# Patient Record
Sex: FEMALE | Race: WHITE | NOT HISPANIC OR LATINO | URBAN - METROPOLITAN AREA
[De-identification: names, ages, dates, MRNs, and addresses within clinical notes are randomized per-mention and may not be internally consistent; named-entity substitution may affect disease eponyms.]

---

## 2022-02-18 ENCOUNTER — EMERGENCY (EMERGENCY)
Facility: HOSPITAL | Age: 47
LOS: 1 days | Discharge: HOME | End: 2022-02-18
Attending: PERSONAL EMERGENCY RESPONSE ATTENDANT
Payer: COMMERCIAL

## 2022-02-18 VITALS
DIASTOLIC BLOOD PRESSURE: 78 MMHG | OXYGEN SATURATION: 100 % | HEART RATE: 69 BPM | WEIGHT: 134.04 LBS | TEMPERATURE: 97 F | SYSTOLIC BLOOD PRESSURE: 132 MMHG | RESPIRATION RATE: 18 BRPM

## 2022-02-18 LAB
ALP SERPL-CCNC: 57 U/L — SIGNIFICANT CHANGE UP (ref 40–120)
ALT FLD-CCNC: 13 U/L — SIGNIFICANT CHANGE UP (ref 10–45)
ANION GAP SERPL CALC-SCNC: 16 MMOL/L — SIGNIFICANT CHANGE UP (ref 5–17)
APPEARANCE UR: CLEAR — SIGNIFICANT CHANGE UP
AST SERPL-CCNC: 16 U/L — SIGNIFICANT CHANGE UP (ref 10–40)
BASE EXCESS BLDV CALC-SCNC: 2.8 MMOL/L — HIGH (ref -2–2)
BASOPHILS # BLD AUTO: 0.03 K/UL — SIGNIFICANT CHANGE UP (ref 0–0.2)
BASOPHILS NFR BLD AUTO: 0.5 % — SIGNIFICANT CHANGE UP (ref 0–2)
BILIRUB SERPL-MCNC: 0.8 MG/DL — SIGNIFICANT CHANGE UP (ref 0.2–1.2)
BILIRUB UR-MCNC: NEGATIVE — SIGNIFICANT CHANGE UP
BUN SERPL-MCNC: 13 MG/DL — SIGNIFICANT CHANGE UP (ref 7–23)
CA-I SERPL-SCNC: 1.24 MMOL/L — SIGNIFICANT CHANGE UP (ref 1.15–1.33)
CALCIUM SERPL-MCNC: 10.2 MG/DL — SIGNIFICANT CHANGE UP (ref 8.4–10.5)
CHLORIDE BLDV-SCNC: 99 MMOL/L — SIGNIFICANT CHANGE UP (ref 96–108)
CHLORIDE SERPL-SCNC: 99 MMOL/L — SIGNIFICANT CHANGE UP (ref 96–108)
CO2 BLDV-SCNC: 28 MMOL/L — HIGH (ref 22–26)
CO2 SERPL-SCNC: 23 MMOL/L — SIGNIFICANT CHANGE UP (ref 22–31)
COLOR SPEC: COLORLESS — SIGNIFICANT CHANGE UP
CREAT SERPL-MCNC: 0.81 MG/DL — SIGNIFICANT CHANGE UP (ref 0.5–1.3)
DIFF PNL FLD: NEGATIVE — SIGNIFICANT CHANGE UP
EOSINOPHIL # BLD AUTO: 0 K/UL — SIGNIFICANT CHANGE UP (ref 0–0.5)
EOSINOPHIL NFR BLD AUTO: 0 % — SIGNIFICANT CHANGE UP (ref 0–6)
GAS PNL BLDV: 134 MMOL/L — LOW (ref 136–145)
GAS PNL BLDV: SIGNIFICANT CHANGE UP
GLUCOSE BLDV-MCNC: 95 MG/DL — SIGNIFICANT CHANGE UP (ref 70–99)
GLUCOSE SERPL-MCNC: 89 MG/DL — SIGNIFICANT CHANGE UP (ref 70–99)
GLUCOSE UR QL: NEGATIVE — SIGNIFICANT CHANGE UP
HCG UR QL: NEGATIVE — SIGNIFICANT CHANGE UP
HCO3 BLDV-SCNC: 27 MMOL/L — SIGNIFICANT CHANGE UP (ref 22–29)
HCT VFR BLD CALC: 40.8 % — SIGNIFICANT CHANGE UP (ref 34.5–45)
HCT VFR BLDA CALC: 42 % — SIGNIFICANT CHANGE UP (ref 34.5–46.5)
HGB BLD CALC-MCNC: 14 G/DL — SIGNIFICANT CHANGE UP (ref 11.7–16.1)
HGB BLD-MCNC: 13.7 G/DL — SIGNIFICANT CHANGE UP (ref 11.5–15.5)
IMM GRANULOCYTES NFR BLD AUTO: 0.3 % — SIGNIFICANT CHANGE UP (ref 0–1.5)
KETONES UR-MCNC: ABNORMAL
LACTATE BLDV-MCNC: 1.1 MMOL/L — SIGNIFICANT CHANGE UP (ref 0.7–2)
LEUKOCYTE ESTERASE UR-ACNC: NEGATIVE — SIGNIFICANT CHANGE UP
LIDOCAIN IGE QN: 41 U/L — SIGNIFICANT CHANGE UP (ref 7–60)
LYMPHOCYTES # BLD AUTO: 1.2 K/UL — SIGNIFICANT CHANGE UP (ref 1–3.3)
LYMPHOCYTES # BLD AUTO: 18.3 % — SIGNIFICANT CHANGE UP (ref 13–44)
MCHC RBC-ENTMCNC: 29.8 PG — SIGNIFICANT CHANGE UP (ref 27–34)
MCHC RBC-ENTMCNC: 33.6 GM/DL — SIGNIFICANT CHANGE UP (ref 32–36)
MCV RBC AUTO: 88.9 FL — SIGNIFICANT CHANGE UP (ref 80–100)
MONOCYTES # BLD AUTO: 0.47 K/UL — SIGNIFICANT CHANGE UP (ref 0–0.9)
MONOCYTES NFR BLD AUTO: 7.2 % — SIGNIFICANT CHANGE UP (ref 2–14)
NEUTROPHILS # BLD AUTO: 4.84 K/UL — SIGNIFICANT CHANGE UP (ref 1.8–7.4)
NEUTROPHILS NFR BLD AUTO: 73.7 % — SIGNIFICANT CHANGE UP (ref 43–77)
NITRITE UR-MCNC: NEGATIVE — SIGNIFICANT CHANGE UP
NRBC # BLD: 0 /100 WBCS — SIGNIFICANT CHANGE UP (ref 0–0)
PCO2 BLDV: 40 MMHG — SIGNIFICANT CHANGE UP (ref 39–42)
PH BLDV: 7.44 — HIGH (ref 7.32–7.43)
PH UR: 6 — SIGNIFICANT CHANGE UP (ref 5–8)
PLATELET # BLD AUTO: 249 K/UL — SIGNIFICANT CHANGE UP (ref 150–400)
PO2 BLDV: 16 MMHG — LOW (ref 25–45)
POTASSIUM BLDV-SCNC: 3.9 MMOL/L — SIGNIFICANT CHANGE UP (ref 3.5–5.1)
POTASSIUM SERPL-MCNC: 4.1 MMOL/L — SIGNIFICANT CHANGE UP (ref 3.5–5.3)
POTASSIUM SERPL-SCNC: 4.1 MMOL/L — SIGNIFICANT CHANGE UP (ref 3.5–5.3)
PROT SERPL-MCNC: 7.3 G/DL — SIGNIFICANT CHANGE UP (ref 6–8.3)
PROT UR-MCNC: NEGATIVE — SIGNIFICANT CHANGE UP
RBC # BLD: 4.59 M/UL — SIGNIFICANT CHANGE UP (ref 3.8–5.2)
RBC # FLD: 12.7 % — SIGNIFICANT CHANGE UP (ref 10.3–14.5)
SAO2 % BLDV: 28.2 % — LOW (ref 67–88)
SODIUM SERPL-SCNC: 138 MMOL/L — SIGNIFICANT CHANGE UP (ref 135–145)
SP GR SPEC: 1.02 — SIGNIFICANT CHANGE UP (ref 1.01–1.02)
UROBILINOGEN FLD QL: NEGATIVE — SIGNIFICANT CHANGE UP
WBC # BLD: 6.56 K/UL — SIGNIFICANT CHANGE UP (ref 3.8–10.5)
WBC # FLD AUTO: 6.56 K/UL — SIGNIFICANT CHANGE UP (ref 3.8–10.5)

## 2022-02-18 PROCEDURE — 99285 EMERGENCY DEPT VISIT HI MDM: CPT

## 2022-02-18 PROCEDURE — 74177 CT ABD & PELVIS W/CONTRAST: CPT | Mod: 26,MB

## 2022-02-18 RX ORDER — SODIUM CHLORIDE 9 MG/ML
1000 INJECTION INTRAMUSCULAR; INTRAVENOUS; SUBCUTANEOUS ONCE
Refills: 0 | Status: COMPLETED | OUTPATIENT
Start: 2022-02-18 | End: 2022-02-18

## 2022-02-18 RX ORDER — ONDANSETRON 8 MG/1
4 TABLET, FILM COATED ORAL ONCE
Refills: 0 | Status: COMPLETED | OUTPATIENT
Start: 2022-02-18 | End: 2022-02-18

## 2022-02-18 RX ORDER — KETOROLAC TROMETHAMINE 30 MG/ML
15 SYRINGE (ML) INJECTION ONCE
Refills: 0 | Status: DISCONTINUED | OUTPATIENT
Start: 2022-02-18 | End: 2022-02-18

## 2022-02-18 RX ORDER — MORPHINE SULFATE 50 MG/1
4 CAPSULE, EXTENDED RELEASE ORAL ONCE
Refills: 0 | Status: DISCONTINUED | OUTPATIENT
Start: 2022-02-18 | End: 2022-02-18

## 2022-02-18 RX ADMIN — Medication 15 MILLIGRAM(S): at 23:29

## 2022-02-18 RX ADMIN — ONDANSETRON 4 MILLIGRAM(S): 8 TABLET, FILM COATED ORAL at 20:43

## 2022-02-18 RX ADMIN — SODIUM CHLORIDE 1000 MILLILITER(S): 9 INJECTION INTRAMUSCULAR; INTRAVENOUS; SUBCUTANEOUS at 20:43

## 2022-02-18 RX ADMIN — MORPHINE SULFATE 4 MILLIGRAM(S): 50 CAPSULE, EXTENDED RELEASE ORAL at 20:43

## 2022-02-18 RX ADMIN — MORPHINE SULFATE 4 MILLIGRAM(S): 50 CAPSULE, EXTENDED RELEASE ORAL at 21:00

## 2022-02-18 NOTE — ED ADULT NURSE NOTE - OBJECTIVE STATEMENT
Pt presents with c/o abdominal pain since 1230pm today with associate nausea no vomiting with no relief from Tylenol or warm pack. Reports hx hysterectomy 8 months ago without complications, denies fever chills diarrhea vaginal bleeding

## 2022-02-18 NOTE — ED ADULT TRIAGE NOTE - CHIEF COMPLAINT QUOTE
LLQ pain that radiates down to the groin beginning at 1230. Hx hysterectomy June 2021, denies urinary symptoms

## 2022-02-18 NOTE — ED PROVIDER NOTE - PROGRESS NOTE DETAILS
Blake Kathleen MD, Attending: Patient received at attending sign out. pt is now feeling better, 4/10, no acute pathological findings from both ct and us, can be raptured ovarian cyst, given fluid collection in the pelvis, pain management, GYN follow up, pt understands and agreed with the plan. I have given the pt strict return and follow up precautions. The patient has been provided with a copy of all pertinent results. The patient has been informed of all concerning signs and symptoms to return to Emergency Department, the necessity to follow up with PMD/Clinic/follow up provided within 2-3 days was explained, and the patient reports understanding of above with capacity and insight.

## 2022-02-18 NOTE — ED PROVIDER NOTE - OBJECTIVE STATEMENT
47 y/o female with pmhx of fibroids s/p hysterectomy, appendectomy presenting with LLQ abdominal pain x 1 day. Started abruptly around 12 pm with no trauma/falls. Does state that she did some heavy lifting today. LLQ abdominal pain radiates to left flank 45 y/o female with pmhx of fibroids s/p hysterectomy, appendectomy presenting with LLQ abdominal pain x 1 day. Started abruptly around 12 pm with no trauma/falls. Does state that she did some heavy lifting today. LLQ abdominal pain radiates to left flank with no vaginal bleeding, vaginal discharge, dysuria. Intermittent nausea with no fevers/chills, vomiting/diarrhea, cough, or rashes.

## 2022-02-18 NOTE — ED PROVIDER NOTE - PATIENT PORTAL LINK FT
You can access the FollowMyHealth Patient Portal offered by [facility] by registering at the following website: [email or web address]. By joining [employer]’s FollowMyHealth portal, you will also be able to view your health information using other applications (apps) compatible with our system.

## 2022-02-18 NOTE — ED PROVIDER NOTE - PHYSICAL EXAMINATION
Gen: WDWN, uncomfortable appearing, holding left side of abdomen   HEENT: EOMI, no nasal discharge, mucous membranes moist, no oropharyngeal edema/erythema/exudates   CV: RRR, +S1/S2, no M/R/G, equal b/l radial pulses 2+  Resp: CTAB, no W/R/R, no increased WOB   GI: Abdomen soft non-distended, significant TTP in LLQ with guarding and no rebound, no masses/organomegaly   MSK/Skin: No CVA tenderness, no open wounds, no bruising, no LE edema  Neuro: A&Ox4, moving all 4 extremities spontaneously, gross sensation intact in UE and LE BL  Psych: appropriate mood

## 2022-02-18 NOTE — ED PROVIDER NOTE - NS ED ROS FT
Gen: Denies fever  CV: Denies chest pain, palpitations  Skin: Denies rash, erythema, color changes  Resp: Denies SOB, cough  Endo: Denies sensitivity to heat, cold, increased urination  GI: Denies diarrhea, constipation, vomiting  Msk: Denies LE swelling, extremity pain  : Denies dysuria, increased frequency  Neuro: Denies LOC, weakness

## 2022-02-18 NOTE — ED PROVIDER NOTE - NSFOLLOWUPCLINICS_GEN_ALL_ED_FT
[facility] Gynecology and Obstetrics  Gynceology/OB  [address] [place] [address]  Phone: [phone]  Fax:   Follow Up Time: 7-10 Days

## 2022-02-18 NOTE — ED PROVIDER NOTE - NSFOLLOWUPINSTRUCTIONS_ED_ALL_ED_FT
1) Take 400-600mg Ibuprofen (also called Advil or Mortrin) every 6 hours as needed for fever/pain/discomfort. You can take this with Tylenol 650-1000 mg (also called acetaminophen) every 6 hours. You can take these medications 3 hrs apart in order to have a layered effect. Don't use more than 4000 mg of Tylenol in any 24-hour period. Make sure your other prescription/over-the-counter medications don't contain any Tylenol so you don't take too much. If you have any stomach discomfort while taking Motrin, you can use TUMS or Pepcid or Zantac (these can all be bought without a prescription). Please do not take these medications if you do no have pain or if you have any history of bleeding disorders, kidney or liver disease. Do not use ibuprofen if you are on blood thinners (anti-coagulation).  2) If your pain is NOT well controlled with these medications, take pain medication as prescribed.  3) Drink at least 2 Liters or 64 Ounces of water each day.    Please take Oxycodone every 6 hours, as needed for SEVERE pain. Don't exceed 4 tabs per day. Please do not drive or operate heavy [place] while on this medication. May cause drowsiness.  Alcohol may intensify this effect.  Caution federal law prohibits the transfer of this drug to any person other  than the person for whom it was prescribed.  This prescription cannot be refilled.  This product contains acetaminophen.  Do not use  with any other product containing acetaminophen to prevent possible liver damage.

## 2022-02-18 NOTE — ED PROVIDER NOTE - ATTENDING CONTRIBUTION TO CARE
Attending MD Spear.  Agree with HPI/ROS/PE by resident physician.  Pt is exquisitely tender on exam.  She has no plans of future fertility and had a hysterectomy/cervical resection but retains bilateral ovaries.  Exam and hx more c/w gradual onset pain and diffuse TTP over LLQ and LUQ.  Suspect bowel pathology however will obtain HCG and CTAP.  If non-actionable, may pursue pelvic sono for ovarian pathology. Attending MD Spear.  Agree with HPI/ROS/PE by resident physician.  Pt is exquisitely tender on exam.  She has no plans of future fertility and had a hysterectomy/cervical resection but retains bilateral ovaries.  Exam and hx more c/w gradual onset pain and diffuse TTP over LLQ and LUQ.  Suspect bowel pathology however will obtain HCG and CTAP.  If non-actionable, may pursue pelvic sono for ovarian pathology.  Pt hemodynamically stable at time of signout to incoming team.

## 2022-02-18 NOTE — ED PROVIDER NOTE - CLINICAL SUMMARY MEDICAL DECISION MAKING FREE TEXT BOX
47 y/o female with pmhx of fibroids s/p hysterectomy, appendectomy presenting with LLQ abdominal pain x 1 day, no vaginal bleeding, vaginal discharge, dysuria, no infectious symptoms, uncomfortable appearing with significant TTP in LLQ with guarding. CTAP given tenderness with UA/UC for suspicion of stone vs. diverticulitis. Also suspicious for possible torsion but given hysterectomy will obtain CTAP first with possible f/u TVUS. Morphine for pain control and reassess after labs/imaging

## 2022-02-19 VITALS
RESPIRATION RATE: 18 BRPM | DIASTOLIC BLOOD PRESSURE: 64 MMHG | SYSTOLIC BLOOD PRESSURE: 112 MMHG | HEART RATE: 66 BPM | TEMPERATURE: 98 F | OXYGEN SATURATION: 98 %

## 2022-02-19 LAB
ALBUMIN SERPL ELPH-MCNC: 4.8 G/DL — SIGNIFICANT CHANGE UP (ref 3.3–5)
SARS-COV-2 RNA SPEC QL NAA+PROBE: SIGNIFICANT CHANGE UP

## 2022-02-19 PROCEDURE — 74177 CT ABD & PELVIS W/CONTRAST: CPT | Mod: MB

## 2022-02-19 PROCEDURE — 76830 TRANSVAGINAL US NON-OB: CPT | Mod: 26

## 2022-02-19 PROCEDURE — 85025 COMPLETE CBC W/AUTO DIFF WBC: CPT

## 2022-02-19 PROCEDURE — 82435 ASSAY OF BLOOD CHLORIDE: CPT

## 2022-02-19 PROCEDURE — 82803 BLOOD GASES ANY COMBINATION: CPT

## 2022-02-19 PROCEDURE — 83690 ASSAY OF LIPASE: CPT

## 2022-02-19 PROCEDURE — 36415 COLL VENOUS BLD VENIPUNCTURE: CPT

## 2022-02-19 PROCEDURE — 81025 URINE PREGNANCY TEST: CPT

## 2022-02-19 PROCEDURE — 84295 ASSAY OF SERUM SODIUM: CPT

## 2022-02-19 PROCEDURE — 99284 EMERGENCY DEPT VISIT MOD MDM: CPT | Mod: 25

## 2022-02-19 PROCEDURE — U0003: CPT

## 2022-02-19 PROCEDURE — 93975 VASCULAR STUDY: CPT | Mod: 26

## 2022-02-19 PROCEDURE — 76830 TRANSVAGINAL US NON-OB: CPT

## 2022-02-19 PROCEDURE — 82947 ASSAY GLUCOSE BLOOD QUANT: CPT

## 2022-02-19 PROCEDURE — 83605 ASSAY OF LACTIC ACID: CPT

## 2022-02-19 PROCEDURE — 85014 HEMATOCRIT: CPT

## 2022-02-19 PROCEDURE — 80053 COMPREHEN METABOLIC PANEL: CPT

## 2022-02-19 PROCEDURE — 93975 VASCULAR STUDY: CPT

## 2022-02-19 PROCEDURE — U0005: CPT

## 2022-02-19 PROCEDURE — 82330 ASSAY OF CALCIUM: CPT

## 2022-02-19 PROCEDURE — 96376 TX/PRO/DX INJ SAME DRUG ADON: CPT

## 2022-02-19 PROCEDURE — 96375 TX/PRO/DX INJ NEW DRUG ADDON: CPT

## 2022-02-19 PROCEDURE — 84132 ASSAY OF SERUM POTASSIUM: CPT

## 2022-02-19 PROCEDURE — 96374 THER/PROPH/DIAG INJ IV PUSH: CPT | Mod: XU

## 2022-02-19 PROCEDURE — 81003 URINALYSIS AUTO W/O SCOPE: CPT

## 2022-02-19 PROCEDURE — 85018 HEMOGLOBIN: CPT

## 2022-02-19 RX ORDER — OXYCODONE HYDROCHLORIDE 5 MG/1
1 TABLET ORAL
Qty: 8 | Refills: 0
Start: 2022-02-19 | End: 2022-02-20

## 2022-02-19 RX ORDER — MORPHINE SULFATE 50 MG/1
4 CAPSULE, EXTENDED RELEASE ORAL ONCE
Refills: 0 | Status: DISCONTINUED | OUTPATIENT
Start: 2022-02-19 | End: 2022-02-19

## 2022-02-19 RX ORDER — OXYCODONE AND ACETAMINOPHEN 5; 325 MG/1; MG/1
1 TABLET ORAL ONCE
Refills: 0 | Status: DISCONTINUED | OUTPATIENT
Start: 2022-02-19 | End: 2022-02-19

## 2022-02-19 RX ADMIN — MORPHINE SULFATE 4 MILLIGRAM(S): 50 CAPSULE, EXTENDED RELEASE ORAL at 02:36

## 2022-02-19 RX ADMIN — OXYCODONE AND ACETAMINOPHEN 1 TABLET(S): 5; 325 TABLET ORAL at 03:46

## 2022-02-19 RX ADMIN — Medication 15 MILLIGRAM(S): at 00:00

## 2022-03-27 NOTE — ED ADULT TRIAGE NOTE - PAIN RATING/NUMBER SCALE (0-10): REST
· 10

## 2022-04-22 PROBLEM — Z00.00 ENCOUNTER FOR PREVENTIVE HEALTH EXAMINATION: Status: ACTIVE | Noted: 2022-04-22

## 2022-05-09 ENCOUNTER — NON-APPOINTMENT (OUTPATIENT)
Age: 47
End: 2022-05-09

## 2022-05-09 ENCOUNTER — APPOINTMENT (OUTPATIENT)
Dept: HEART AND VASCULAR | Facility: CLINIC | Age: 47
End: 2022-05-09
Payer: COMMERCIAL

## 2022-05-09 VITALS
WEIGHT: 132 LBS | BODY MASS INDEX: 20.72 KG/M2 | HEART RATE: 77 BPM | DIASTOLIC BLOOD PRESSURE: 72 MMHG | SYSTOLIC BLOOD PRESSURE: 124 MMHG | HEIGHT: 67 IN | TEMPERATURE: 95.6 F

## 2022-05-09 DIAGNOSIS — Z85.42 PERSONAL HISTORY OF MALIGNANT NEOPLASM OF OTHER PARTS OF UTERUS: ICD-10-CM

## 2022-05-09 DIAGNOSIS — Z87.891 PERSONAL HISTORY OF NICOTINE DEPENDENCE: ICD-10-CM

## 2022-05-09 DIAGNOSIS — Z86.018 PERSONAL HISTORY OF OTHER BENIGN NEOPLASM: ICD-10-CM

## 2022-05-09 PROCEDURE — 93000 ELECTROCARDIOGRAM COMPLETE: CPT

## 2022-05-09 PROCEDURE — 99204 OFFICE O/P NEW MOD 45 MIN: CPT

## 2022-05-09 RX ORDER — NEOMYCIN AND POLYMYXIN B SULFATES AND HYDROCORTISONE OTIC 10; 3.5; 1 MG/ML; MG/ML; [USP'U]/ML
3.5-10000-1 SUSPENSION AURICULAR (OTIC)
Qty: 10 | Refills: 0 | Status: COMPLETED | COMMUNITY
Start: 2022-03-23

## 2022-05-09 RX ORDER — PREDNISONE 20 MG/1
20 TABLET ORAL
Qty: 3 | Refills: 0 | Status: COMPLETED | COMMUNITY
Start: 2022-01-17

## 2022-05-09 RX ORDER — PREDNISONE 50 MG/1
50 TABLET ORAL
Qty: 5 | Refills: 0 | Status: COMPLETED | COMMUNITY
Start: 2022-01-14

## 2022-05-09 RX ORDER — ALBUTEROL SULFATE 90 UG/1
108 (90 BASE) INHALANT RESPIRATORY (INHALATION)
Qty: 18 | Refills: 0 | Status: ACTIVE | COMMUNITY
Start: 2022-01-17

## 2022-05-09 RX ORDER — FLUTICASONE FUROATE, UMECLIDINIUM BROMIDE AND VILANTEROL TRIFENATATE 200; 62.5; 25 UG/1; UG/1; UG/1
200-62.5-25 POWDER RESPIRATORY (INHALATION)
Qty: 180 | Refills: 0 | Status: ACTIVE | COMMUNITY
Start: 2022-03-22

## 2022-05-09 RX ORDER — BENRALIZUMAB 10 MG/.5ML
INJECTION, SOLUTION SUBCUTANEOUS
Refills: 0 | Status: ACTIVE | COMMUNITY

## 2022-05-09 RX ORDER — OXYCODONE 5 MG/1
5 TABLET ORAL
Qty: 8 | Refills: 0 | Status: COMPLETED | COMMUNITY
Start: 2022-02-19

## 2022-05-09 NOTE — DISCUSSION/SUMMARY
[FreeTextEntry1] : Ms. Ash is a pleasant 47 year-old female with a past medical history significant for uterine CA (s/p XRT, CTX, and hysterectomy 2010), Asthma (receiving immunotherapy bi weekly), COVID (1/2022, completed course of steroids outpatient), MVP and palpitations who presents for initial evaluation.  Given her complaint of intermittent palpitations I have recommended she undergo ambulatory telemetry for heart rhythm assessment. She presents in sinus rhythm on ECG today.  Given history of MVP I have ordered an echocardiogram for cardiac structure and function.  We discussed that if she has evidence of MVP this may contribute to her palpitations.  She was asked to return for follow-up in 6 weeks or sooner as needed.

## 2022-05-09 NOTE — ADDENDUM
[FreeTextEntry1] : I, Janneth Wade, hereby attest that the medical record entry for this patient accurately reflects signatures/notations that I made on the Date of Service in my capacity as an Attending Physician when I treated/diagnosed the above patient. I do hereby attest that this information is true, accurate and complete to the best of my knowledge.  I was present for the entire visit and supervised the entire visit and made/agree with the plan as outlined.\par

## 2022-05-09 NOTE — HISTORY OF PRESENT ILLNESS
[FreeTextEntry1] : Ms. Ash is a pleasant 47 year-old female with a past medical history significant for uterine CA (s/p XRT, CTX, and hysterectomy 2010), Asthma (receiving immunotherapy bi weekly), COVID (1/2022, completed course of steroids outpatient), MVP and palpitations who presents for initial evaluation. \par \par Notes episodic dizziness, SOB, palpitations, "sparkles" in b/l eyes and left arm numbness that lasts for 15 minutes.  Occurs up to four times per day, both with rest and activity.  Happened once while driving.  She does not routinely exercise but walks a lot.  \par \par Echo 3/2019 Normal LVEF, MVP, trace MR, mild TR, mild pHTN

## 2022-05-27 ENCOUNTER — APPOINTMENT (OUTPATIENT)
Dept: HEART AND VASCULAR | Facility: CLINIC | Age: 47
End: 2022-05-27
Payer: COMMERCIAL

## 2022-05-27 PROCEDURE — 93248 EXT ECG>7D<15D REV&INTERPJ: CPT

## 2022-06-10 ENCOUNTER — OUTPATIENT (OUTPATIENT)
Dept: OUTPATIENT SERVICES | Facility: HOSPITAL | Age: 47
LOS: 1 days | End: 2022-06-10
Payer: COMMERCIAL

## 2022-06-10 DIAGNOSIS — I34.1 NONRHEUMATIC MITRAL (VALVE) PROLAPSE: ICD-10-CM

## 2022-06-10 DIAGNOSIS — R00.2 PALPITATIONS: ICD-10-CM

## 2022-06-10 PROCEDURE — 93306 TTE W/DOPPLER COMPLETE: CPT

## 2022-06-10 PROCEDURE — 93306 TTE W/DOPPLER COMPLETE: CPT | Mod: 26

## 2022-07-11 ENCOUNTER — NON-APPOINTMENT (OUTPATIENT)
Age: 47
End: 2022-07-11

## 2022-07-11 ENCOUNTER — APPOINTMENT (OUTPATIENT)
Dept: HEART AND VASCULAR | Facility: CLINIC | Age: 47
End: 2022-07-11

## 2022-07-11 VITALS
HEIGHT: 67 IN | DIASTOLIC BLOOD PRESSURE: 77 MMHG | BODY MASS INDEX: 20.72 KG/M2 | WEIGHT: 132 LBS | SYSTOLIC BLOOD PRESSURE: 118 MMHG | TEMPERATURE: 97.8 F | HEART RATE: 67 BPM

## 2022-07-11 PROCEDURE — 93000 ELECTROCARDIOGRAM COMPLETE: CPT

## 2022-07-11 PROCEDURE — 99213 OFFICE O/P EST LOW 20 MIN: CPT

## 2022-07-12 NOTE — DISCUSSION/SUMMARY
[FreeTextEntry1] : Ms. Ash is a pleasant 47 year-old female with a past medical history significant for uterine CA (s/p XRT, CTX, and hysterectomy 2010), Asthma (receiving immunotherapy bi weekly), COVID (1/2022, completed course of steroids outpatient), reported history of MVP and palpitations who presents for follow-up. Recent echocardiogram showed normal cardiac structure and function (no MVP).  Ambulatory telemetry did not demonstrate any sustained arrhythmias.  No intervention was recommended at this point.  She will return for follow-up in six months or sooner as needed.

## 2022-07-12 NOTE — CARDIOLOGY SUMMARY
[de-identified] : 5/9/22 SR @ 69 bpm  [de-identified] : Stress Echo normal 3/2019  [de-identified] : Darryl 5/13 - 5/29/22 NSR, Mobitz Type I, rare ectopy

## 2022-07-12 NOTE — HISTORY OF PRESENT ILLNESS
[FreeTextEntry1] : Ms. Ash is a pleasant 47 year-old female with a past medical history significant for uterine CA (s/p XRT, CTX, and hysterectomy 2010), Asthma (receiving immunotherapy bi weekly), COVID (1/2022, completed course of steroids outpatient), MVP and palpitations who presents for follow-up.\par \par Noted episodic dizziness, SOB, palpitations, "sparkles" in b/l eyes and left arm numbness that lasts for 15 minutes.  This has not been recurrent.  She does not routinely exercise but walks a lot.  \par \par Echo 3/2019 Normal LVEF, MVP, trace MR, mild TR, mild pHTN

## 2023-01-23 ENCOUNTER — APPOINTMENT (OUTPATIENT)
Dept: HEART AND VASCULAR | Facility: CLINIC | Age: 48
End: 2023-01-23

## 2023-06-05 ENCOUNTER — EMERGENCY (EMERGENCY)
Facility: HOSPITAL | Age: 48
LOS: 1 days | Discharge: HOME | End: 2023-06-05
Attending: EMERGENCY MEDICINE | Admitting: EMERGENCY MEDICINE
Payer: COMMERCIAL

## 2023-06-05 VITALS
OXYGEN SATURATION: 100 % | HEIGHT: 67 IN | TEMPERATURE: 98 F | HEART RATE: 77 BPM | RESPIRATION RATE: 18 BRPM | SYSTOLIC BLOOD PRESSURE: 127 MMHG | WEIGHT: 130.07 LBS | DIASTOLIC BLOOD PRESSURE: 78 MMHG

## 2023-06-05 VITALS
HEART RATE: 70 BPM | OXYGEN SATURATION: 99 % | RESPIRATION RATE: 18 BRPM | SYSTOLIC BLOOD PRESSURE: 127 MMHG | DIASTOLIC BLOOD PRESSURE: 67 MMHG

## 2023-06-05 DIAGNOSIS — R53.1 WEAKNESS: ICD-10-CM

## 2023-06-05 DIAGNOSIS — F41.9 ANXIETY DISORDER, UNSPECIFIED: ICD-10-CM

## 2023-06-05 DIAGNOSIS — R00.2 PALPITATIONS: ICD-10-CM

## 2023-06-05 DIAGNOSIS — R07.89 OTHER CHEST PAIN: ICD-10-CM

## 2023-06-05 DIAGNOSIS — R20.2 PARESTHESIA OF SKIN: ICD-10-CM

## 2023-06-05 PROCEDURE — 84484 ASSAY OF TROPONIN QUANT: CPT

## 2023-06-05 PROCEDURE — 82553 CREATINE MB FRACTION: CPT

## 2023-06-05 PROCEDURE — 99285 EMERGENCY DEPT VISIT HI MDM: CPT | Mod: 25

## 2023-06-05 PROCEDURE — 85730 THROMBOPLASTIN TIME PARTIAL: CPT

## 2023-06-05 PROCEDURE — 82550 ASSAY OF CK (CPK): CPT

## 2023-06-05 PROCEDURE — 85025 COMPLETE CBC W/AUTO DIFF WBC: CPT

## 2023-06-05 PROCEDURE — 85610 PROTHROMBIN TIME: CPT

## 2023-06-05 PROCEDURE — 99285 EMERGENCY DEPT VISIT HI MDM: CPT

## 2023-06-05 PROCEDURE — 93005 ELECTROCARDIOGRAM TRACING: CPT

## 2023-06-05 PROCEDURE — 71045 X-RAY EXAM CHEST 1 VIEW: CPT

## 2023-06-05 PROCEDURE — 71045 X-RAY EXAM CHEST 1 VIEW: CPT | Mod: 26

## 2023-06-05 PROCEDURE — 80053 COMPREHEN METABOLIC PANEL: CPT

## 2023-06-05 PROCEDURE — 36415 COLL VENOUS BLD VENIPUNCTURE: CPT

## 2023-06-05 NOTE — ED ADULT NURSE NOTE - OBJECTIVE STATEMENT
patient presents to ED with Lt. sided chest pain radiating to Lt. arm for days, recently her friend passed away by heart attack and states patient feels anxiety. denies sob, dizziness, b/l legs swelling, fever, cough, n/v/abd pain. A&OX4. stable at bed.

## 2023-06-05 NOTE — ED ADULT TRIAGE NOTE - CHIEF COMPLAINT QUOTE
pt c/o left sided chest pain radiating to the left arm , palpitations,  since Saturday. advised by cardiologist to come to ED. pt stated " she recently loss a friend from a heart attacked and she has been very anxious" ekg in progress.

## 2023-06-05 NOTE — ED PROVIDER NOTE - CARE PROVIDER_API CALL
Anders Bailey  Cardiovascular Disease  [address] [address]  Phone: [phone]  Fax: [phone]  Follow Up Time:     Jorge Franco  Cardiology  [address] [address]  Phone: [phone]  Fax: [phone]  Follow Up Time:

## 2023-06-05 NOTE — ED ADULT NURSE NOTE - NSFALLUNIVINTERV_ED_ALL_ED
Bed/Stretcher in lowest position, wheels locked, appropriate side rails in place/Call bell, personal items and telephone in reach/Instruct patient to call for assistance before getting out of bed/chair/stretcher/Non-slip footwear applied when patient is off stretcher/[place] to call system/Physically safe environment - no spills, clutter or unnecessary equipment/Purposeful proactive rounding/Room/bathroom lighting operational, light cord in reach

## 2023-06-05 NOTE — ED PROVIDER NOTE - PATIENT PORTAL LINK FT
You can access the FollowMyHealth Patient Portal offered by [facility] by registering at the following website: [email or web address]. By joining [employer]’s FollowMyHealth portal, you will also be able to view your health information using other applications (apps) compatible with our system.

## 2023-06-21 ENCOUNTER — NON-APPOINTMENT (OUTPATIENT)
Age: 48
End: 2023-06-21

## 2023-06-21 ENCOUNTER — APPOINTMENT (OUTPATIENT)
Dept: HEART AND VASCULAR | Facility: CLINIC | Age: 48
End: 2023-06-21
Payer: COMMERCIAL

## 2023-06-21 VITALS
HEIGHT: 67 IN | TEMPERATURE: 98.3 F | DIASTOLIC BLOOD PRESSURE: 80 MMHG | WEIGHT: 138 LBS | OXYGEN SATURATION: 97 % | BODY MASS INDEX: 21.66 KG/M2 | HEART RATE: 68 BPM | SYSTOLIC BLOOD PRESSURE: 112 MMHG

## 2023-06-21 LAB — TSH SERPL-ACNC: 0.79 UIU/ML

## 2023-06-21 PROCEDURE — 99204 OFFICE O/P NEW MOD 45 MIN: CPT | Mod: 25

## 2023-06-21 PROCEDURE — 93000 ELECTROCARDIOGRAM COMPLETE: CPT

## 2023-06-21 NOTE — PHYSICAL EXAM
[No Murmur] : no murmur [Normal] : alert and oriented, normal memory [de-identified] : mid systolic click

## 2023-06-21 NOTE — ASSESSMENT
[FreeTextEntry1] : EKG: NSR normal\par A/P\par \par 1. palpitations\par 2. HX MVP\par Hx as above\par recurrent symptoms\par recent Ed visit\par repeat echo to assess MVP/ MR\par Zio\par TSH \par

## 2023-06-21 NOTE — HISTORY OF PRESENT ILLNESS
[FreeTextEntry1] : past medical history significant for MVP\par PMHx  uterine CA (s/p XRT, CTX, and hysterectomy 2010), Asthma (receiving immunotherapy bi weekly), COVID (1/2022, completed course of steroids outpatient), seen previously for palpitations who presents for initial evaluation. \par \par Previously described episodic dizziness, SOB, palpitations, "sparkles" in b/l eyes and left arm numbness that lasts for 15 minutes. Occurs up to four times per day, both with rest and activity. Happened once while driving. She does not routinely exercise but walks a lot. \par cardiac w/u 2022 included \par echocardiogram showed normal cardiac structure and function (no MVP). Ambulatory telemetry did not demonstrate any sustained arrhythmias.  \par \par Symptoms resolved then w OTC / lifestyle remedies\par  past few mos, increased emotional stress accompanied by near daily multiple episodes tachycardia, accompanied by sticking CP. 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

## 2023-06-30 ENCOUNTER — APPOINTMENT (OUTPATIENT)
Dept: HEART AND VASCULAR | Facility: CLINIC | Age: 48
End: 2023-06-30
Payer: COMMERCIAL

## 2023-06-30 PROCEDURE — 93306 TTE W/DOPPLER COMPLETE: CPT

## 2023-07-10 ENCOUNTER — TRANSCRIPTION ENCOUNTER (OUTPATIENT)
Age: 48
End: 2023-07-10

## 2023-07-19 ENCOUNTER — APPOINTMENT (OUTPATIENT)
Dept: HEART AND VASCULAR | Facility: CLINIC | Age: 48
End: 2023-07-19
Payer: COMMERCIAL

## 2023-07-19 DIAGNOSIS — R00.2 PALPITATIONS: ICD-10-CM

## 2023-07-19 DIAGNOSIS — I34.1 NONRHEUMATIC MITRAL (VALVE) PROLAPSE: ICD-10-CM

## 2023-07-19 PROCEDURE — 99441: CPT

## 2023-07-19 RX ORDER — DILTIAZEM HYDROCHLORIDE 120 MG/1
120 CAPSULE, EXTENDED RELEASE ORAL DAILY
Qty: 30 | Refills: 5 | Status: ACTIVE | COMMUNITY
Start: 2023-07-19 | End: 1900-01-01

## 2023-07-21 PROBLEM — I34.1 MVP (MITRAL VALVE PROLAPSE): Status: ACTIVE | Noted: 2022-05-09

## 2023-07-21 PROBLEM — R00.2 HEART PALPITATIONS: Status: ACTIVE | Noted: 2022-05-09

## 2023-07-21 NOTE — HISTORY OF PRESENT ILLNESS
[FreeTextEntry1] : past medical history significant for MVP\par PMHx uterine CA (s/p XRT, CTX, and hysterectomy 2010), Asthma (receiving immunotherapy bi weekly), COVID (1/2022, completed course of steroids outpatient), seen previously for palpitations who presents for initial evaluation. \par \par Previously described episodic dizziness, SOB, palpitations, "sparkles" in b/l eyes and left arm numbness that lasts for 15 minutes. Occurs up to four times per day, both with rest and activity. Happened once while driving. She does not routinely exercise but walks a lot. \par cardiac w/u 2022 included \par echocardiogram showed normal cardiac structure and function (no MVP). Ambulatory telemetry did not demonstrate any sustained arrhythmias. \par \par Symptoms resolved then w OTC / lifestyle remedies\par  past few mos, increased emotional stress accompanied by near daily multiple episodes tachycardia, accompanied by sticking CP. 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

## 2023-07-21 NOTE — ASSESSMENT
[FreeTextEntry1] : A/P\par \par 1. palpitations\par 2. HX MVP\par Hx as above\par recurrent symptoms\par recent Ed visit\par repeat echo to assess MVP/ MR - no MVP or signif MR\par Zio as above \par TSH nl\par \par in view of severe asthma will avoid beta blockers\par Can start dilt prn

## 2025-06-17 NOTE — ED PROVIDER NOTE - OBJECTIVE STATEMENT
no rashes , no jaundice present 48 F co gen weakness, sharp int cp w deep inspiration over the last 48 F co gen weakness, sharp int cp w deep inspiration over the last 2-3 days pain assoc w palpitations and tingling to both legs and L arm- tingling sensation comes and goes- no sob no morales no lightheadedness- px has ho palpitations- had full alaniz from Dr Wade showed no abnml- px has been extremely anxious over the past few days bc friend suddenly  of MI recently  exac- anxiety  no sig allev factors